# Patient Record
Sex: FEMALE | Race: BLACK OR AFRICAN AMERICAN | NOT HISPANIC OR LATINO | URBAN - METROPOLITAN AREA
[De-identification: names, ages, dates, MRNs, and addresses within clinical notes are randomized per-mention and may not be internally consistent; named-entity substitution may affect disease eponyms.]

---

## 2016-11-15 LAB — PAP SMEAR, EXTERNAL: NORMAL

## 2019-10-10 ENCOUNTER — APPOINTMENT (RX ONLY)
Dept: URBAN - METROPOLITAN AREA CLINIC 1 | Facility: CLINIC | Age: 39
Setting detail: DERMATOLOGY
End: 2019-10-10

## 2019-10-10 DIAGNOSIS — L72.8 OTHER FOLLICULAR CYSTS OF THE SKIN AND SUBCUTANEOUS TISSUE: ICD-10-CM | Status: RESOLVING

## 2019-10-10 PROBLEM — E13.9 OTHER SPECIFIED DIABETES MELLITUS WITHOUT COMPLICATIONS: Status: ACTIVE | Noted: 2019-10-10

## 2019-10-10 PROCEDURE — 11901 INJECT SKIN LESIONS >7: CPT

## 2019-10-10 PROCEDURE — ? COUNSELING

## 2019-10-10 PROCEDURE — ? INTRALESIONAL KENALOG

## 2019-10-10 PROCEDURE — ? TREATMENT REGIMEN

## 2019-10-10 ASSESSMENT — LOCATION ZONE DERM
LOCATION ZONE: TRUNK
LOCATION ZONE: LEG

## 2019-10-10 ASSESSMENT — LOCATION DETAILED DESCRIPTION DERM
LOCATION DETAILED: LEFT BUTTOCK
LOCATION DETAILED: RIGHT BUTTOCK
LOCATION DETAILED: RIGHT GLUTEAL CREASE
LOCATION DETAILED: LEFT MEDIAL BUTTOCK
LOCATION DETAILED: RIGHT MEDIAL BUTTOCK

## 2019-10-10 ASSESSMENT — LOCATION SIMPLE DESCRIPTION DERM
LOCATION SIMPLE: RIGHT GLUTEAL CREASE
LOCATION SIMPLE: LEFT BUTTOCK
LOCATION SIMPLE: RIGHT BUTTOCK

## 2019-10-10 NOTE — PROCEDURE: TREATMENT REGIMEN
Detail Level: Zone
Continue Regimen: Halog oint to affected areas on buttocks bid after Cetaphil/cerave cream

## 2019-10-10 NOTE — PROCEDURE: INTRALESIONAL KENALOG
Detail Level: Detailed
Concentration Of Solution Injected (Mg/Ml): 2.5
Kenalog Preparation: Kenalog
Treatment Number (Optional): 3
Total Volume Injected (Ccs- Only Use Numbers And Decimals): 1.6
X Size Of Lesion In Cm (Optional): 0
Consent: The risks of atrophy were reviewed with the patient.
Include Z78.9 (Other Specified Conditions Influencing Health Status) As An Associated Diagnosis?: No
Medical Necessity Clause: This procedure was medically necessary because the lesions that were treated were:

## 2019-10-10 NOTE — HPI: CYST
How Severe Is Your Cyst?: moderate
Is This A New Presentation, Or A Follow-Up?: Cysts
Additional History: No new sites today

## 2023-01-24 ENCOUNTER — OFFICE VISIT (OUTPATIENT)
Dept: PRIMARY CARE CLINIC | Age: 43
End: 2023-01-24
Payer: COMMERCIAL

## 2023-01-24 VITALS
HEART RATE: 96 BPM | TEMPERATURE: 97.1 F | BODY MASS INDEX: 38.69 KG/M2 | OXYGEN SATURATION: 97 % | WEIGHT: 232.2 LBS | HEIGHT: 65 IN | SYSTOLIC BLOOD PRESSURE: 119 MMHG | DIASTOLIC BLOOD PRESSURE: 70 MMHG

## 2023-01-24 DIAGNOSIS — H93.13 TINNITUS OF BOTH EARS: ICD-10-CM

## 2023-01-24 DIAGNOSIS — R51.9 FREQUENT HEADACHES: ICD-10-CM

## 2023-01-24 DIAGNOSIS — E11.9 TYPE 2 DIABETES MELLITUS WITHOUT COMPLICATION, WITHOUT LONG-TERM CURRENT USE OF INSULIN (HCC): ICD-10-CM

## 2023-01-24 DIAGNOSIS — H92.03 OTALGIA OF BOTH EARS: ICD-10-CM

## 2023-01-24 DIAGNOSIS — R20.0 BILATERAL HAND NUMBNESS: ICD-10-CM

## 2023-01-24 DIAGNOSIS — R42 VERTIGO: Primary | ICD-10-CM

## 2023-01-24 PROCEDURE — 3052F HG A1C>EQUAL 8.0%<EQUAL 9.0%: CPT | Performed by: INTERNAL MEDICINE

## 2023-01-24 PROCEDURE — 99204 OFFICE O/P NEW MOD 45 MIN: CPT | Performed by: INTERNAL MEDICINE

## 2023-01-24 SDOH — ECONOMIC STABILITY: FOOD INSECURITY: WITHIN THE PAST 12 MONTHS, YOU WORRIED THAT YOUR FOOD WOULD RUN OUT BEFORE YOU GOT MONEY TO BUY MORE.: NEVER TRUE

## 2023-01-24 SDOH — ECONOMIC STABILITY: FOOD INSECURITY: WITHIN THE PAST 12 MONTHS, THE FOOD YOU BOUGHT JUST DIDN'T LAST AND YOU DIDN'T HAVE MONEY TO GET MORE.: NEVER TRUE

## 2023-01-24 ASSESSMENT — ENCOUNTER SYMPTOMS
WHEEZING: 0
VOMITING: 0
RHINORRHEA: 0
ABDOMINAL PAIN: 0
CONSTIPATION: 0
COUGH: 0
SINUS PAIN: 0
NAUSEA: 0
CHEST TIGHTNESS: 0
SORE THROAT: 0
SHORTNESS OF BREATH: 0
DIARRHEA: 0
SINUS PRESSURE: 0
BLOOD IN STOOL: 0

## 2023-01-24 ASSESSMENT — PATIENT HEALTH QUESTIONNAIRE - PHQ9
2. FEELING DOWN, DEPRESSED OR HOPELESS: 0
1. LITTLE INTEREST OR PLEASURE IN DOING THINGS: 0
SUM OF ALL RESPONSES TO PHQ QUESTIONS 1-9: 0
SUM OF ALL RESPONSES TO PHQ9 QUESTIONS 1 & 2: 0
SUM OF ALL RESPONSES TO PHQ QUESTIONS 1-9: 0

## 2023-01-24 ASSESSMENT — SOCIAL DETERMINANTS OF HEALTH (SDOH): HOW HARD IS IT FOR YOU TO PAY FOR THE VERY BASICS LIKE FOOD, HOUSING, MEDICAL CARE, AND HEATING?: NOT HARD AT ALL

## 2023-01-24 NOTE — PROGRESS NOTES
Ronel Poole   Date ofBirth:  1980    Date of Visit:  1/24/2023    Chief Complaint   Patient presents with    New Patient    Dizziness     Patient went to Urgent a 3-4 month ago and was diagnose with vertigo. Symtoms has improve but is having now     Tinnitus    Headache     Sharp headache which comes and goes intermittently  for the past 3-4 month        HPI  Patient presents to [facility] care. 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    Patient states she had vertigo 3-4 months ago for about 5 days. Patient felt like she was spinning. Patient denies nausea and vomiting. Patient states when she would lay down she felt like she was still moving leading up the vertigo episode. Patient states she went to [facility] in [place]. Patient states she had labs and EKG done and they were normal. Patient denies nausea and vomiting. Patient states her head was foggy then. Patient was given Meclizine and vertigo progressive got better with medication. Patient states she has had occasional dizziness 3 times since end of September but not full vertigo. Patient states would have to pause for a moment and then she was fine.    Patient states she has dull ear pain either ear occasionally 1-2 times per week the past 3-4 months. Patient hasn't had to take anything.    Patient has had headache  2-3 times per week sharp or dull in different spots on her head the past 3-4 months.Patient states sometimes pain is base of head and other times frontal head. Patient states if she is on her way to work she will take Aleve and it will go away. Patient states if she is at home she will try to lay down first and only takes Aleve if it doesn't go away. Patient denies dizziness. Patient states her vision is worse since having vertigo. Patient states she had just gotten glasses and they are not as effective as they were before.    Patient complains of numbness both hands that is worse the past 3-4 months.  Patient states she had numbness prior to 3-4 months but is worse since the vertigo episode. Patient states she has a little pain in right hand with numbness that is sharp. Patient complains of tinnitus both ears at least 1-2 times per week since vertigo 3-4 months ago. Patient has Type 2 diabetes. Patient is diet control. Patient states he last Hemoglobin A1c was 7.5% in February of last year. Patient states she is trying to do better at decreasing carbohydrates. Patient is not exercising. Review of Systems   Constitutional:  Negative for chills, fatigue and fever. HENT:  Positive for ear pain and tinnitus. Negative for congestion, postnasal drip, rhinorrhea, sinus pressure, sinus pain, sneezing and sore throat. Eyes:  Negative for visual disturbance. Respiratory:  Negative for cough, chest tightness, shortness of breath and wheezing. Cardiovascular:  Negative for chest pain, palpitations and leg swelling. Gastrointestinal:  Negative for abdominal pain, blood in stool, constipation, diarrhea, nausea and vomiting. Genitourinary:  Negative for dysuria, frequency and hematuria. Musculoskeletal:  Positive for arthralgias. Negative for myalgias. Skin:  Negative for rash. Neurological:  Positive for dizziness, numbness and headaches. Negative for tremors, syncope, weakness and light-headedness. Psychiatric/Behavioral:  Negative for dysphoric mood and sleep disturbance. The patient is not nervous/anxious. No Known Allergies  No outpatient medications have been marked as taking for the 1/24/23 encounter (Office Visit) with Chely Velazquez MD.         Vitals:    01/24/23 1017   BP: 119/70   Site: Left Upper Arm   Position: Sitting   Cuff Size: Medium Adult   Pulse: 96   Temp: 97.1 °F (36.2 °C)   TempSrc: Temporal   SpO2: 97%   Weight: 232 lb 3.2 oz (105.3 kg)   Height: 5' 5\" (1.651 m)     Body mass index is 38.64 kg/m². Physical Exam  Nursing note reviewed.    Constitutional: General: She is not in acute distress. Appearance: Normal appearance. She is well-developed. HENT:      Right Ear: Tympanic membrane and ear canal normal.      Left Ear: Tympanic membrane and ear canal normal.      Mouth/Throat:      Pharynx: Oropharynx is clear. Eyes:      General: Lids are normal.      Extraocular Movements: Extraocular movements intact. Conjunctiva/sclera: Conjunctivae normal.      Pupils: Pupils are equal, round, and reactive to light. Neck:      Thyroid: No thyromegaly. Vascular: No carotid bruit. Cardiovascular:      Rate and Rhythm: Normal rate and regular rhythm. Heart sounds: Normal heart sounds, S1 normal and S2 normal. No murmur heard. No friction rub. No gallop. Pulmonary:      Effort: Pulmonary effort is normal. No respiratory distress. Breath sounds: Normal breath sounds. No wheezing, rhonchi or rales. Abdominal:      General: Bowel sounds are normal. There is no distension. Palpations: Abdomen is soft. Tenderness: There is no abdominal tenderness. Musculoskeletal:      Cervical back: Neck supple. Right lower leg: No edema. Left lower leg: No edema. Lymphadenopathy:      Head:      Right side of head: No submandibular adenopathy. Left side of head: No submandibular adenopathy. Neurological:      Mental Status: She is alert and oriented to person, place, and time. Cranial Nerves: No cranial nerve deficit. Psychiatric:         Mood and Affect: Mood normal.         No results found for this visit on 01/24/23. Lab Review         Assessment/Plan     1. Vertigo  - MRI BRAIN W WO CONTRAST; Future  - continue Meclizine 25mg as needed  - [facility]    2. Otalgia of both ears  - Tylenol 650mg 3 times daily as needed  - [facility]    3. Frequent headaches  - MRI BRAIN W WO CONTRAST;  Future  - Continue Aleve as needed    4. Bilateral hand numbness  - MRI BRAIN W WO CONTRAST; Future  - Referral to Jesse Pat MD, Physical Medicine and Rehabilitation (EMG), [facility]  - Vitamin B12; Future  - Magnesium; Future    5. Tinnitus of both ears  -Referral to Daniel Rosado DO, Otolaryngology, [facility]    6. Type 2 diabetes mellitus without complication, without long-term current use of insulin (HCC)  - Hemoglobin A1C; Future  - Basic Metabolic Panel; Future  - Microalbumin / Creatinine Urine Ratio; Future  - patient has been doing diet control and may need medication if no improvement in Hemoglobin A1c  -Limit carbohydrates to 45 grams with meals and 15 grams with snacks  -Regular aerobic exercise      Discussed medications with patient, who voiced understanding of their use and indications. All questions answered.      Return in about 2 weeks (around 2/7/2023) for headaches, numbness, results.

## 2023-02-02 ENCOUNTER — HOSPITAL ENCOUNTER (OUTPATIENT)
Dept: MRI IMAGING | Age: 43
Discharge: HOME | End: 2023-02-02
Payer: COMMERCIAL

## 2023-02-02 DIAGNOSIS — R51.9 FREQUENT HEADACHES: ICD-10-CM

## 2023-02-02 DIAGNOSIS — R42 VERTIGO: ICD-10-CM

## 2023-02-02 DIAGNOSIS — E11.9 TYPE 2 DIABETES MELLITUS WITHOUT COMPLICATION, WITHOUT LONG-TERM CURRENT USE OF INSULIN (HCC): ICD-10-CM

## 2023-02-02 DIAGNOSIS — R20.0 BILATERAL HAND NUMBNESS: ICD-10-CM

## 2023-02-02 LAB
ANION GAP SERPL CALCULATED.3IONS-SCNC: 9 MMOL/L (ref 3–16)
BUN BLDV-MCNC: 19 MG/DL (ref 7–20)
CALCIUM SERPL-MCNC: 9.5 MG/DL (ref 8.3–10.6)
CHLORIDE BLD-SCNC: 98 MMOL/L (ref 99–110)
CO2: 27 MMOL/L (ref 21–32)
CREAT SERPL-MCNC: 0.7 MG/DL (ref 0.6–1.1)
CREATININE URINE: 155.5 MG/DL (ref 28–259)
ESTIMATED AVERAGE GLUCOSE: 182.9 MG/DL
GFR SERPL CREATININE-BSD FRML MDRD: >60 ML/MIN/{1.73_M2}
GLUCOSE BLD-MCNC: 162 MG/DL (ref 70–99)
HBA1C MFR BLD: 8 %
MAGNESIUM: 2 MG/DL (ref 1.8–2.4)
MICROALBUMIN UR-MCNC: <1.2 MG/DL
MICROALBUMIN/CREAT UR-RTO: NORMAL MG/G (ref 0–30)
POTASSIUM SERPL-SCNC: 4.7 MMOL/L (ref 3.5–5.1)
SODIUM BLD-SCNC: 134 MMOL/L (ref 136–145)
VITAMIN B-12: 829 PG/ML (ref 211–911)

## 2023-02-02 PROCEDURE — A9579 GAD-BASE MR CONTRAST NOS,1ML: HCPCS | Performed by: INTERNAL MEDICINE

## 2023-02-02 PROCEDURE — 70553 MRI BRAIN STEM W/O & W/DYE: CPT

## 2023-02-02 PROCEDURE — 6360000004 HC RX CONTRAST MEDICATION: Performed by: INTERNAL MEDICINE

## 2023-02-02 RX ADMIN — GADOTERIDOL 20 ML: 279.3 INJECTION, SOLUTION INTRAVENOUS at 10:35

## 2023-02-13 PROBLEM — H93.13 TINNITUS OF BOTH EARS: Status: ACTIVE | Noted: 2023-02-13

## 2023-02-13 PROBLEM — H92.03 OTALGIA OF BOTH EARS: Status: ACTIVE | Noted: 2023-02-13

## 2023-02-13 PROBLEM — E11.9 TYPE 2 DIABETES MELLITUS WITHOUT COMPLICATION, WITHOUT LONG-TERM CURRENT USE OF INSULIN (HCC): Status: ACTIVE | Noted: 2023-02-13

## 2023-02-13 PROBLEM — R51.9 FREQUENT HEADACHES: Status: ACTIVE | Noted: 2023-02-13

## 2023-02-13 PROBLEM — R42 VERTIGO: Status: ACTIVE | Noted: 2023-02-13

## 2023-02-13 PROBLEM — R20.0 BILATERAL HAND NUMBNESS: Status: ACTIVE | Noted: 2023-02-13

## 2023-02-16 ENCOUNTER — PROCEDURE VISIT (OUTPATIENT)
Dept: AUDIOLOGY | Age: 43
End: 2023-02-16
Payer: COMMERCIAL

## 2023-02-16 ENCOUNTER — OFFICE VISIT (OUTPATIENT)
Dept: ENT CLINIC | Age: 43
End: 2023-02-16
Payer: COMMERCIAL

## 2023-02-16 VITALS
HEART RATE: 100 BPM | OXYGEN SATURATION: 97 % | SYSTOLIC BLOOD PRESSURE: 113 MMHG | TEMPERATURE: 97.1 F | WEIGHT: 235 LBS | BODY MASS INDEX: 39.15 KG/M2 | DIASTOLIC BLOOD PRESSURE: 74 MMHG | HEIGHT: 65 IN

## 2023-02-16 DIAGNOSIS — R42 VERTIGO: Primary | ICD-10-CM

## 2023-02-16 DIAGNOSIS — R42 DIZZINESS: ICD-10-CM

## 2023-02-16 DIAGNOSIS — R42 DIZZINESS AND GIDDINESS: ICD-10-CM

## 2023-02-16 DIAGNOSIS — H93.13 TINNITUS, BILATERAL: Primary | ICD-10-CM

## 2023-02-16 DIAGNOSIS — H93.13 TINNITUS OF BOTH EARS: ICD-10-CM

## 2023-02-16 DIAGNOSIS — Z91.89 RISK FACTORS FOR OBSTRUCTIVE SLEEP APNEA: ICD-10-CM

## 2023-02-16 DIAGNOSIS — R51.9 NONINTRACTABLE EPISODIC HEADACHE, UNSPECIFIED HEADACHE TYPE: ICD-10-CM

## 2023-02-16 DIAGNOSIS — Z01.10 ENCOUNTER FOR EXAMINATION OF EARS AND HEARING WITHOUT ABNORMAL FINDINGS: ICD-10-CM

## 2023-02-16 DIAGNOSIS — H92.03 OTALGIA, BILATERAL: ICD-10-CM

## 2023-02-16 PROCEDURE — 92567 TYMPANOMETRY: CPT | Performed by: AUDIOLOGIST

## 2023-02-16 PROCEDURE — 92557 COMPREHENSIVE HEARING TEST: CPT | Performed by: AUDIOLOGIST

## 2023-02-16 PROCEDURE — 99203 OFFICE O/P NEW LOW 30 MIN: CPT | Performed by: STUDENT IN AN ORGANIZED HEALTH CARE EDUCATION/TRAINING PROGRAM

## 2023-02-16 NOTE — PROGRESS NOTES
Merline Seitz   1980, 37 y.o. female   [id number]       Referring Provider: Afshan Nguyễn DO  Referral Type: In an order in [address] [place] [address]    Reason for Visit: Evaluation of suspected change in hearing, tinnitus, or balance. ADULT AUDIOLOGIC EVALUATION      Merline Seitz is a 37 y.o. female seen today, 2/16/2023, for an initial audiologic evaluation. AUDIOLOGIC AND OTHER PERTINENT MEDICAL HISTORY:        Merline Seitz noted dizziness, spinning sensation, lasted for days around 5 months ago, was ntoiceable hwen upright at that time, now more noticeable when laying down; some ear pain comes and goes, either ear; tinnitus bilaterally, comes and goes over past few months. Merline Seitz denied aural fullness, otorrhea, history of occupational/recreational noise exposure, history of head trauma, history of ear surgery, and family history of early onset hearing loss. IMPRESSIONS:       Today's results are consistent with hearing sensitivity within normal limits with normal middle ear function and excellent word recognition for soft conversational speech for both ears. Follow medical recommendations from Dr. Blessing Resendiz. ASSESSMENT AND FINDINGS:       Otoscopy revealed: Clear ear canals bilaterally      RIGHT EAR:  Hearing Sensitivity: Within normal limits. Speech Recognition Threshold: 10 dBHL  Word Recognition: Excellent (100%), based on NU-6 25-word list at 45 dBHL using recorded speech stimuli. Tympanometry: Normal peak pressure and compliance, Type A tympanogram, consistent with normal middle ear function. LEFT EAR:  Hearing Sensitivity: Within normal limits. Speech Recognition Threshold: 5 dBHL  Word Recognition: Excellent (100%), based on NU-6 25-word list at 45 dBHL using recorded speech stimuli. Tympanometry: Normal peak pressure and compliance, Type A tympanogram, consistent with normal middle ear function. Reliability: Good  Transducer:  Inserts    See scanned audiogram dated 2/16/2023 for results. PATIENT EDUCATION:       The following items were discussed with the patient:   - Good Communication Strategies  - Tinnitus Management Strategies    - Dizziness    Educational information was shared in the After Visit Summary. RECOMMENDATIONS:                                                                                                                                                                                                                                                                      The following items are recommended based on patient report and results from today's appointment:  - Continue medical follow-up with Puma Flanagan DO.  - Retest hearing as medically indicated and/or sooner if a change in hearing is noted. - Utilize \"Good Communication Strategies\" as discussed to assist in speech understanding with communication partners. - Maintain a sound enriched environment to assist in the management of tinnitus symptoms. - If medically indicated, consider vestibular evaluation to further investigate symptoms of dizziness.          [facility] [place], [place]  Audiologist       Chart CC'd to: Puma Flanagan DO      Degree of   Hearing Sensitivity dB Range   Within Normal Limits (WNL) 0 - 20   Mild 20 - 40   Moderate 40 - 55   Moderately-Severe 55 - 70   Severe 70 - 90   Profound 90 +

## 2023-02-16 NOTE — PATIENT INSTRUCTIONS
Tinnitus: Overview and Management Strategies          Many people have some ringing sounds in their ears once in a while. You may hear a roar, a hiss, a tinkle, or a buzz. The sound usually lasts only a few minutes. If it goes on all the time, you may have tinnitus. Tinnitus is usually caused by long-term exposure to loud noise. This damages the nerves in the inner ear. It can occur with all types of hearing loss. It may be a symptom of almost any ear problem. Tinnitus may be caused by a buildup of earwax. Or, it may be caused by ear infections or certain medicines (especially antibiotics or large amounts of aspirin). You can also hear noises in your ears because of an injury to the ears, drinking too much alcohol or caffeine, or a medical condition. Other conditions may also contribute to tinnitus, including: head and neck trauma, temporomandibular joint disorder (TMJ), sinus pressure and barometric trauma, traumatic brain injury, metabolic disorders, autoimmune disorders, stress, and high blood pressure. You may need tests to evaluate your hearing and to find causes of long-lasting tinnitus. Your doctor may suggest one or more treatments to help you cope with the tinnitus. You can also do things at home to help reduce symptoms. Follow-up care is a key part of your treatment and safety. Be sure to make and go to all appointments, and call your doctor if you are having problems. It's also a good idea to know your test results and keep a list of the medicines you take. How can you care for yourself at home? Limit or cut out alcohol, caffeine, and sodium. They can make your symptoms worse. Do not smoke or use other tobacco products. Nicotine reduces blood flow to the ear and makes tinnitus worse. If you need help quitting, talk to your doctor about stop-smoking programs and medicines. These can increase your chances of quitting for good.   Talk to your doctor about whether to stop taking aspirin and similar products such as ibuprofen or naproxen. Get exercise often. It can help improve blood flow to the ear. Ways to manage/cope with tinnitus  Some tinnitus may last a long time. To manage your tinnitus, try to: Avoid noises that you think caused your tinnitus. If you can't avoid loud noises, wear earplugs or earmuffs. Ignore the sound by paying attention to other things. Keeping your brain busy with other tasks or background noise can help your brain not focus on the tinnitus. Try to not give the tinnitus an emotional reaction. Do your best to ignore the sound and not let it bother you. Relax using biofeedback, meditation, or yoga. Feeling stressed and being tired can make tinnitus worse. Play music or white noise to help you sleep. Background noise may cover up the noise that you hear in your ears. You can buy a tabletop machine or a device that sits under your pillow to play soothing sounds, like ocean waves. Smart phones have free apps, such as Whist, Relax Melodies, ReSound Relief, and [employer]. These apps have different types of sounds/noise, some of which you can blend together to find sounds that are most soothing to you. Hearing aid technology, especially when there is some hearing loss, may help reduce tinnitus symptoms by giving your brain better access to the sounds it is missing. There are some hearing aids with built-in noise generator programs, which may help when amplification alone is not enough. Additional resources may be found through the American Tinnitus Association at www.aris.org    When should you call for help? Call 911 anytime you think you may need emergency care. For example, call if:    You have symptoms of a stroke. These may include:  Sudden numbness, tingling, weakness, or loss of movement in your face, arm, or leg, especially on only one side of your body. Sudden vision changes. Sudden trouble speaking.   Sudden confusion or trouble understanding simple statements. Sudden problems with walking or balance. A sudden, severe headache that is different from past headaches. Call your doctor now or seek immediate medical care if:    You develop other symptoms. These may include hearing loss (or worse hearing loss), balance problems, dizziness, nausea, or vomiting. Watch closely for changes in your health, and be sure to contact your doctor if:    Your tinnitus moves from both ears to one ear. Your hearing loss gets worse within 1 day after an ear injury. Your tinnitus or hearing loss does not get better within 1 week after an ear injury. Your tinnitus bothers you enough that you want to take medicines to help you cope with it. If you notice changes in your tinnitus and/or your hearing, it is recommended that you have your hearing tested by your audiologist and to follow-up with your physician that manages your hearing loss (such as your ENT or Primary Care doctor). Good Communication Strategies    Communication can be challenging for anyone, but can be especially difficult for those with some degree of hearing loss. While we may not be able to control every factor that may lead to difficulty with communication, there are Good Communication Strategies that we can all use in our day-to-day lives. Communication takes both parties working together for it to be successful. Tips as a Listener:   Control your environment. It is important to limit the amount of background noise in the room when possible. You should also consider having a good light source in the room to best see the other person. Ask for clarification. Instead of saying \"What?\", you can use parts of what you heard to make a new question. For example, if you heard the word \"Thursday\" but not the rest of the week, you may ask \"What was that about Thursday? \" or \"What did you want to do Thursday? \".   This shows the person talking that you are listening and will help them better explain what they are saying. Be an advocate for yourself. If you are hearing but not understanding, tell the other person \"I can hear you, but I need you to slow down when you speak. \"  Or if someone is facing the other direction, say \"I cannot hear you when you are not looking at me when we talk. \"       Tips as a Talker:   - Sit or stand 3 to 6 feet away to maximize audibility         -- It is unrealistic to believe someone else will fully hear your message if you are speaking from across the room or in a different room in the house   - Stay at eye level to help with visual cues   - Make sure you have the persons attention before speaking   - Use facial expressions and gestures to accentuate your message   - Raise your voice slightly (do not scream)   - Speak slowly and distinctly   - Use short, simple sentences   - Rephrase your words if the person is having a hard time understanding you    - To avoid distortion, dont speak directly into a persons ear      Some additional items that may be helpful:   - Remain patient - this is important for both parties   - Write down items that still cannot be heard/understood. You may write with pen/paper or consider typing/texting on a cell phone or smart device. - If background noise is unavoidable, try to keep yourself in a good position in the room. By sitting at a cross on the side of the restaurant (preferably a corner), it will be easier to communicate than if you were sitting at a table in the middle with background noise surrounding you. Keep yourself positioned away from music speakers or heavy foot traffic.   - If you have difficulty with the television, consider these options:      -- Use closed-captioning, which is a setting you can turn on that displays the spoken words in a written form on the screen. There may be a slight delay, but this can help fill in missing information.   This can be especially helpful when watching programs with accented speech. -- Consider use of a sound bar or speakers that come from the front of the TV. With modern flat screen TVs, many of them have speakers that come out of the back of the device, which makes sound bounce off the wall behind it, then go into the room. Sound bars can allow the sound to go straight in your direction and can improve sound quality. -- Consider ear level devices to help improve the volume and/or sound quality of the program.  There are devices that work like headphones that you can adjust the volume for your ears while others can have the volume at a more comfortable level, such as \"TV Ears\". Most hearing aids have devices that allow them to connect directly to the TV and improve sound quality. Dizziness: Care Instructions  Your Care Instructions  Dizziness is the feeling of unsteadiness or fuzziness in your head. It is different than having vertigo, which is a feeling that the room is spinning or that you are moving or falling. It is also different from lightheadedness, which is the feeling that you are about to faint. It can be hard to know what causes dizziness. Some people feel dizzy when they have migraine headaches. Sometimes bouts of flu can make you feel dizzy. Some medical conditions, such as heart problems or high blood pressure, can make you feel dizzy. Many medicines can cause dizziness, including medicines for high blood pressure, pain, or anxiety. If a medicine causes your symptoms, your doctor may recommend that you stop or change the medicine. If it is a problem with your heart, you may need medicine to help your heart work better. If there is no clear reason for your symptoms, your doctor may suggest watching and waiting for a while to see if the dizziness goes away on its own. Follow-up care is a key part of your treatment and safety. Be sure to make and go to all appointments, and call your doctor if you are having problems.  It's also a good idea to know your test results and keep a list of the medicines you take. How can you care for yourself at home? If your doctor recommends or prescribes medicine, take it exactly as directed. Call your doctor if you think you are having a problem with your medicine. Do not drive while you feel dizzy. Try to prevent falls. Steps you can take include:  Using nonskid mats, adding grab bars near the tub, and using night-lights. Clearing your home so that walkways are free of anything you might trip on. Letting family and friends know that you have been feeling dizzy. This will help them know how to help you. When should you call for help? Call 911 anytime you think you may need emergency care. For example, call if:    You passed out (lost consciousness). You have dizziness along with symptoms of a heart attack. These may include:  Chest pain or pressure, or a strange feeling in the chest.  Sweating. Shortness of breath. Nausea or vomiting. Pain, pressure, or a strange feeling in the back, neck, jaw, or upper belly or in one or both shoulders or arms. Lightheadedness or sudden weakness. A fast or irregular heartbeat. You have symptoms of a stroke. These may include:  Sudden numbness, tingling, weakness, or loss of movement in your face, arm, or leg, especially on only one side of your body. Sudden vision changes. Sudden trouble speaking. Sudden confusion or trouble understanding simple statements. Sudden problems with walking or balance. A sudden, severe headache that is different from past headaches. Call your doctor now or seek immediate medical care if:    You feel dizzy and have a fever, headache, or ringing in your ears. You have new or increased nausea and vomiting. Your dizziness does not go away or comes back. Watch closely for changes in your health, and be sure to contact your doctor if:    You do not get better as expected. Where can you learn more?   Go to [email or web address] and sign in to your [employer] account. Enter K006 in the [place] box to learn more about \"Dizziness: Care Instructions. \"     If you do not have an account, please click on the \"Sign Up Now\" link. Current as of: September 23, 2018  Content Version: 11.9  © 2134-8862 [employer]. Care instructions adapted under license by [facility] ([facility]). If you have questions about a medical condition or this instruction, always ask your healthcare professional. [address] any warranty or liability for your use of this information.

## 2023-02-16 NOTE — PROGRESS NOTES
[address] [facility] SURGERY  NEW PATIENT HISTORY AND PHYSICAL NOTE      Patient Name: [facility] Record Number:  [id number]  Primary Care Physician:  None Provider    ChiefComplaint     Chief Complaint   Patient presents with    Dizziness     Dizziness since having vertigo, feels like im moving when im laying down, intermitted ringing in ear        History of Present Illness     Chester Ch is an 37 y.o. female presenting with dizziness. In September she woke up with room spinning dizziness. No associated nausea or vomiting. Dizziness exacerbated by standing up. Was causing difficulty focusing. She went to urgent care and had a EKG done. Approximately 20 years ago she had a prior episode of similar symptoms, went to [facility] and was told she had vertigo. Her episode in September lasted approximately 5 days. Meclizine seemed to help. She did home positional therapies which also seem to help. No longer taking meclizine. She has right greater than left nonpulsatile tinnitus that started around the time her vertigo started. Tinnitus has improved and is currently less frequent. Denies recent changes in hearing. Mild right ear discomfort at times. No otorrhea. Frequent ear infections as a child, thinks she had tubes placed as a child. No other history of otologic surgery. No family history of early onset hearing loss. No loud noise exposures. Denies environmental allergies. Currently when she lies down to go to sleep feels like she is moving and heart is racing. Will check pulse and it is 80. When she sits up she feels fine. She is quite tired throughout the day and is not feeling she gets restful sleep. No history of prior sleep study    She is also had headaches in September. Will oftentimes get quite intense and go away after a few minutes. If does not go away after a few minutes she will take Aleve. Denies ROS.   No sensitivity to light or sound. Feels like her dizziness is not correlated to her headaches. Sometimes when she gets dizzy she will see stars at the beginning of her dizziness episodes. She has a neurology appointment scheduled. No hx of HTN. Hx of DMT2. Currently diet controlled but most recent A1c is 8. She does endorse eating a lot of fast foods. Past Medical History     History reviewed. No pertinent past medical history. Past Surgical History     History reviewed. No pertinent surgical history. Family History     History reviewed. No pertinent family history. Social History     Social History     Tobacco Use    Smoking status: Every Day     Packs/day: 0.50     Types: Cigarettes     Start date: 2003    Smokeless tobacco: Never   Substance Use Topics    Alcohol use: Yes     Alcohol/week: 2.0 standard drinks     Types: 1 Glasses of wine, 1 Shots of liquor per week    Drug use: Never        Allergies     No Known Allergies    Medications     No current outpatient medications on file. No current facility-administered medications for this visit. Review of Systems     REVIEW OF SYSTEMS    See HPI Above    PhysicalExam     Vitals:    02/16/23 0952   BP: 113/74   Pulse: 100   Temp: 97.1 °F (36.2 °C)   TempSrc: Temporal   SpO2: 97%   Weight: 235 lb (106.6 kg)   Height: 5' 5\" (1.651 m)       PHYSICAL EXAM  /74   Pulse 100   Temp 97.1 °F (36.2 °C) (Temporal)   Ht 5' 5\" (1.651 m)   Wt 235 lb (106.6 kg)   SpO2 97%   BMI 39.11 kg/m²     GENERAL: No acute distress, alert and oriented  EYES: EOMI, Anti-icteric. No resting or gaze nystagmus. NOSE: On anterior rhinoscopy there is no epistaxis, nasal mucosa moist and normal appearing, no purulent drainage.    EARS: Normal external appearance; on portable otomicroscopy:     -Ad: External auditory canal without stenosis, tympanic membrane clear, no middle ear effusions or retractions.      -As: External auditory canal without stenosis, tympanic membrane clear, no middle ear effusions or retractions. Pneumatic otoscopy: Bilateral tympanic membranes mobile pneumatic otoscopy  FACE: HB 1/6 bilaterally, symmetric appearing, sensation equal bilaterally  ORAL CAVITY: No masses or lesions visualized or palpated, uvula is midline, moist mucous membranes, no oropharyngeal masses or oropharyngeal obstruction. Tonsils 2+. NECK: Normal range of motion, no thyromegaly, trachea is midline, no palpable lymphadenopathy or neck masses, no crepitus  NEURO: Cranial Nerves 2, 3, 4, 5, 6, 7, 11, 12 intact bilaterally. Regular gait steady. Rittman-Hallpike: No torsional nystagmus on bilateral Gladys-Hallpike testing. I have performed a head and neck physical exam personally or was physically present during the key or critical portions of the service. Data/Imaging Review     Media Information  Document Information    [facility] Clinical:  Audiology   Audiogram and Tympanogram 2/16/2023 02/16/2023   Attached To:   Procedure visit on 2/16/23 with Bere Lema, [address] [facility], [facility] Audiology       MRI Result (most recent):  MRI BRAIN W WO CONTRAST 02/02/2023    Narrative  EXAM: MRI BRAIN W WO CONTRAST    INDICATION: Vertigo, headache    COMPARISON: None    TECHNIQUE: Multiplanar, multisequence MR imaging of the head obtained without and with IV. IV contrast: 8 mL IV ProHance. FINDINGS:    MIDLINE STRUCTURES: The sella turcica and pituitary gland are normal. Craniovertebral alignment and cerebellar tonsils are normal.    VENTRICLES: Normal size and configuration for patient age. INTRACRANIAL HEMORRHAGE: None. BRAIN PARENCHYMA: Brain parenchyma is normal signal. There is no diffusion restriction. No pathologic intracranial enhancement. INTRACRANIAL VASCULATURE: Major intracranial vessels are grossly patent. ORBITS: Normal.    BONE MARROW: Bone marrow signal within normal limits.     VISUALIZED CERVICAL SPINE: Normal    PARANASAL SINUSES/MASTOID BONES: No acute sinusitis or mastoiditis.    Impression  Normal MRI of the head      Assessment and Plan     1. Vertigo  2. Dizziness  -Low suspicion for peripheral vestibulopathy.  Given vague vertiginous symptoms offered VNG but patient would like to hold off on this for now.  Recommend eating a low-salt diet, less than 2 g a day.  Would stop eating all types of fast food.      3. Nonintractable episodic headache, unspecified headache type  -Headaches can certainly be a cause of dizziness which I spoke to the patient about.  Start vitamin B2 and magnesium supplementation.  She has an appointment with neurology in the future which I have recommended that she keep as treating her headaches may very well help with her dizziness.    4. Tinnitus of both ears  -Reviewed audiogram with the patient.  Hearing within normal limits with normal tympanogram and excellent word discrimination bilaterally.  Recommend masking techniques for tinnitus management.    5. Risk factors for obstructive sleep apnea  -Would benefit from sleep medicine referral and PSG to rule out sleep apnea.  She works night shifts as an RN and states that her sleep schedule and sleep patterns are quite horrible.  - Julian Salazar MD, Sleep Medicine, [facility]      Follow Up     Return if symptoms worsen or fail to improve.        Dr. Daniel Mcclendon,   [facility]  Department of Otolaryngology/Head & Neck Surgery  2/22/23    Medical Decision Making:  The following items were considered in medical decision making:  Independent review of images  Review / order clinical lab tests  Review / order radiology tests  Decision to obtain old records    This note was generated completely or in part utilizing Dragon dictation speech recognition software.  Occasionally, words are mistranscribed and despite editing, the text may contain inaccuracies due to incorrect word  recognition. If further clarification is needed please contact the office at [phone].

## 2023-02-16 NOTE — Clinical Note
Dr. Cesar Lo,  Please see note from this patient's audiogram.  Please let me know if there is anything further you need.    [address] Audiologist

## 2023-04-26 ENCOUNTER — OFFICE VISIT (OUTPATIENT)
Dept: PRIMARY CARE CLINIC | Age: 43
End: 2023-04-26
Payer: COMMERCIAL

## 2023-04-26 VITALS
DIASTOLIC BLOOD PRESSURE: 84 MMHG | WEIGHT: 239 LBS | OXYGEN SATURATION: 100 % | BODY MASS INDEX: 39.77 KG/M2 | SYSTOLIC BLOOD PRESSURE: 132 MMHG | HEART RATE: 97 BPM

## 2023-04-26 DIAGNOSIS — R53.83 FATIGUE, UNSPECIFIED TYPE: ICD-10-CM

## 2023-04-26 DIAGNOSIS — M79.10 MYALGIA: ICD-10-CM

## 2023-04-26 DIAGNOSIS — E11.9 TYPE 2 DIABETES MELLITUS WITHOUT COMPLICATION, WITHOUT LONG-TERM CURRENT USE OF INSULIN (HCC): ICD-10-CM

## 2023-04-26 DIAGNOSIS — R51.9 FREQUENT HEADACHES: Primary | ICD-10-CM

## 2023-04-26 DIAGNOSIS — K21.9 GASTROESOPHAGEAL REFLUX DISEASE, UNSPECIFIED WHETHER ESOPHAGITIS PRESENT: ICD-10-CM

## 2023-04-26 DIAGNOSIS — L84 CALLUS OF FOOT: ICD-10-CM

## 2023-04-26 DIAGNOSIS — F51.01 PRIMARY INSOMNIA: ICD-10-CM

## 2023-04-26 DIAGNOSIS — R20.0 NUMBNESS OF RIGHT FOOT: ICD-10-CM

## 2023-04-26 DIAGNOSIS — F17.200 CURRENT SMOKER: ICD-10-CM

## 2023-04-26 DIAGNOSIS — R20.0 BILATERAL HAND NUMBNESS: ICD-10-CM

## 2023-04-26 PROCEDURE — 3052F HG A1C>EQUAL 8.0%<EQUAL 9.0%: CPT | Performed by: INTERNAL MEDICINE

## 2023-04-26 PROCEDURE — 99214 OFFICE O/P EST MOD 30 MIN: CPT | Performed by: INTERNAL MEDICINE

## 2023-04-26 RX ORDER — MELOXICAM 7.5 MG/1
7.5 TABLET ORAL DAILY
Qty: 30 TABLET | Refills: 0 | Status: SHIPPED | OUTPATIENT
Start: 2023-04-26

## 2023-04-26 RX ORDER — OMEPRAZOLE 20 MG/1
20 CAPSULE, DELAYED RELEASE ORAL
Qty: 30 CAPSULE | Refills: 1 | Status: SHIPPED | OUTPATIENT
Start: 2023-04-26

## 2023-04-26 RX ORDER — SEMAGLUTIDE 0.68 MG/ML
0.25 INJECTION, SOLUTION SUBCUTANEOUS WEEKLY
Qty: 3 ML | Refills: 1 | Status: SHIPPED | OUTPATIENT
Start: 2023-04-26

## 2023-04-26 SDOH — ECONOMIC STABILITY: INCOME INSECURITY: HOW HARD IS IT FOR YOU TO PAY FOR THE VERY BASICS LIKE FOOD, HOUSING, MEDICAL CARE, AND HEATING?: NOT HARD AT ALL

## 2023-04-26 SDOH — ECONOMIC STABILITY: HOUSING INSECURITY
IN THE LAST 12 MONTHS, WAS THERE A TIME WHEN YOU DID NOT HAVE A STEADY PLACE TO SLEEP OR SLEPT IN A SHELTER (INCLUDING NOW)?: NO

## 2023-04-26 SDOH — ECONOMIC STABILITY: FOOD INSECURITY: WITHIN THE PAST 12 MONTHS, THE FOOD YOU BOUGHT JUST DIDN'T LAST AND YOU DIDN'T HAVE MONEY TO GET MORE.: NEVER TRUE

## 2023-04-26 SDOH — ECONOMIC STABILITY: FOOD INSECURITY: WITHIN THE PAST 12 MONTHS, YOU WORRIED THAT YOUR FOOD WOULD RUN OUT BEFORE YOU GOT MONEY TO BUY MORE.: NEVER TRUE

## 2023-04-26 SDOH — ECONOMIC STABILITY: TRANSPORTATION INSECURITY
IN THE PAST 12 MONTHS, HAS LACK OF TRANSPORTATION KEPT YOU FROM MEETINGS, WORK, OR FROM GETTING THINGS NEEDED FOR DAILY LIVING?: NO

## 2023-04-27 ENCOUNTER — TELEPHONE (OUTPATIENT)
Dept: ADMINISTRATIVE | Age: 43
End: 2023-04-27

## 2023-04-28 ENCOUNTER — OFFICE VISIT (OUTPATIENT)
Dept: PULMONOLOGY | Age: 43
End: 2023-04-28
Payer: COMMERCIAL

## 2023-04-28 VITALS
SYSTOLIC BLOOD PRESSURE: 115 MMHG | BODY MASS INDEX: 39.31 KG/M2 | HEART RATE: 103 BPM | WEIGHT: 236.2 LBS | DIASTOLIC BLOOD PRESSURE: 70 MMHG | OXYGEN SATURATION: 99 %

## 2023-04-28 DIAGNOSIS — R06.83 SNORING: ICD-10-CM

## 2023-04-28 DIAGNOSIS — E66.09 CLASS 2 OBESITY DUE TO EXCESS CALORIES WITHOUT SERIOUS COMORBIDITY WITH BODY MASS INDEX (BMI) OF 39.0 TO 39.9 IN ADULT: ICD-10-CM

## 2023-04-28 DIAGNOSIS — G47.33 OSA (OBSTRUCTIVE SLEEP APNEA): Primary | ICD-10-CM

## 2023-04-28 PROCEDURE — 99204 OFFICE O/P NEW MOD 45 MIN: CPT | Performed by: INTERNAL MEDICINE

## 2023-04-28 ASSESSMENT — SLEEP AND FATIGUE QUESTIONNAIRES
HOW LIKELY ARE YOU TO NOD OFF OR FALL ASLEEP WHEN YOU ARE A PASSENGER IN A CAR FOR AN HOUR WITHOUT A BREAK: 1
HOW LIKELY ARE YOU TO NOD OFF OR FALL ASLEEP WHILE WATCHING TV: 2
ESS TOTAL SCORE: 11
HOW LIKELY ARE YOU TO NOD OFF OR FALL ASLEEP WHILE LYING DOWN TO REST IN THE AFTERNOON WHEN CIRCUMSTANCES PERMIT: 3
HOW LIKELY ARE YOU TO NOD OFF OR FALL ASLEEP WHILE SITTING QUIETLY AFTER LUNCH WITHOUT ALCOHOL: 2
HOW LIKELY ARE YOU TO NOD OFF OR FALL ASLEEP WHILE SITTING AND READING: 3
HOW LIKELY ARE YOU TO NOD OFF OR FALL ASLEEP WHILE SITTING AND TALKING TO SOMEONE: 0
HOW LIKELY ARE YOU TO NOD OFF OR FALL ASLEEP WHILE SITTING INACTIVE IN A PUBLIC PLACE: 0
HOW LIKELY ARE YOU TO NOD OFF OR FALL ASLEEP IN A CAR, WHILE STOPPED FOR A FEW MINUTES IN TRAFFIC: 0

## 2023-04-28 NOTE — PROGRESS NOTES
Every Day     Packs/day: 0.50     Types: Cigarettes     Start date: 1/1/2003    Smokeless tobacco: Never   Substance Use Topics    Alcohol use: Yes     Alcohol/week: 2.0 standard drinks     Types: 1 Glasses of wine, 1 Shots of liquor per week    Drug use: Never       FAMILY HISTORY: History reviewed. No pertinent family history. MEDICATIONS:     Current Outpatient Medications on File Prior to Visit   Medication Sig Dispense Refill    Semaglutide,0.25 or 0.5MG/DOS, (OZEMPIC, 0.25 OR 0.5 MG/DOSE,) 2 MG/3ML SOPN Inject 0.25 mg into the skin once a week (Patient not taking: Reported on 4/28/2023) 3 mL 1    omeprazole (PRILOSEC) 20 MG delayed release capsule Take 1 capsule by mouth every morning (before breakfast) (Patient not taking: Reported on 4/28/2023) 30 capsule 1    meloxicam (MOBIC) 7.5 MG tablet Take 1 tablet by mouth daily (Patient not taking: Reported on 4/28/2023) 30 tablet 0     No current facility-administered medications on file prior to visit. ALLERGIES:   Allergies as of 04/28/2023    (No Known Allergies)      OBJECTIVE:   weight is 236 lb 3.2 oz (107.1 kg). Her blood pressure is 115/70 and her pulse is 103 (abnormal). Her oxygen saturation is 99%. PHYSICAL EXAM:    CONSTITUTIONAL: She is a 37y.o.-year-old who appears her stated age. She is alert and oriented x 3 and in no acute distress. HEENT: PERRL No scleral icterus. No thrush, atraumatic, normocephalic. NECK: Supple, without cervical or supraclavicular lymphadenopathy:  CARDIOVASCULAR: S1 S2 RRR. Without murmer  RESPIRATORY & CHEST: Lungs are clear to auscultation and percussion. No wheezing, no crackles. Good air movement  GASTROINTESTINAL & ABDOMEN: Soft, nontender, positive bowel sounds in all quadrants, non-distended, without hepatosplenomegaly. GENITOURINARY: Deferred. MUSCULOSKELETAL: No tenderness to palpation of the axial skeleton. There is no clubbing. No cyanosis. No edema of the lower extremities.    SKIN OF

## 2023-05-02 NOTE — TELEPHONE ENCOUNTER
Spoke with patient, notified her that PA is still pending. And when a decision is received our office will reach out to her.

## 2023-05-04 DIAGNOSIS — E11.9 TYPE 2 DIABETES MELLITUS WITHOUT COMPLICATION, WITHOUT LONG-TERM CURRENT USE OF INSULIN (HCC): ICD-10-CM

## 2023-05-04 DIAGNOSIS — R53.83 FATIGUE, UNSPECIFIED TYPE: ICD-10-CM

## 2023-05-04 DIAGNOSIS — M79.10 MYALGIA: ICD-10-CM

## 2023-05-04 LAB
ALBUMIN SERPL-MCNC: 4.2 G/DL (ref 3.4–5)
ALBUMIN/GLOB SERPL: 1.6 {RATIO} (ref 1.1–2.2)
ALP SERPL-CCNC: 72 U/L (ref 40–129)
ALT SERPL-CCNC: 10 U/L (ref 10–40)
ANION GAP SERPL CALCULATED.3IONS-SCNC: 10 MMOL/L (ref 3–16)
AST SERPL-CCNC: 11 U/L (ref 15–37)
BASOPHILS # BLD: 0 K/UL (ref 0–0.2)
BASOPHILS NFR BLD: 0.4 %
BILIRUB SERPL-MCNC: 0.5 MG/DL (ref 0–1)
BUN SERPL-MCNC: 12 MG/DL (ref 7–20)
CALCIUM SERPL-MCNC: 9 MG/DL (ref 8.3–10.6)
CHLORIDE SERPL-SCNC: 101 MMOL/L (ref 99–110)
CHOLEST SERPL-MCNC: 210 MG/DL (ref 0–199)
CK SERPL-CCNC: 43 U/L (ref 26–192)
CO2 SERPL-SCNC: 26 MMOL/L (ref 21–32)
CREAT SERPL-MCNC: 0.8 MG/DL (ref 0.6–1.1)
DEPRECATED RDW RBC AUTO: 13.9 % (ref 12.4–15.4)
EOSINOPHIL # BLD: 0 K/UL (ref 0–0.6)
EOSINOPHIL NFR BLD: 0.3 %
ERYTHROCYTE [SEDIMENTATION RATE] IN BLOOD BY WESTERGREN METHOD: 39 MM/HR (ref 0–20)
GFR SERPLBLD CREATININE-BSD FMLA CKD-EPI: >60 ML/MIN/{1.73_M2}
GLUCOSE SERPL-MCNC: 147 MG/DL (ref 70–99)
HCT VFR BLD AUTO: 39.9 % (ref 36–48)
HDLC SERPL-MCNC: 54 MG/DL (ref 40–60)
HGB BLD-MCNC: 13.3 G/DL (ref 12–16)
LDLC SERPL CALC-MCNC: 140 MG/DL
LYMPHOCYTES # BLD: 2.6 K/UL (ref 1–5.1)
LYMPHOCYTES NFR BLD: 32.2 %
MCH RBC QN AUTO: 30.5 PG (ref 26–34)
MCHC RBC AUTO-ENTMCNC: 33.4 G/DL (ref 31–36)
MCV RBC AUTO: 91.4 FL (ref 80–100)
MONOCYTES # BLD: 0.4 K/UL (ref 0–1.3)
MONOCYTES NFR BLD: 5.3 %
NEUTROPHILS # BLD: 4.9 K/UL (ref 1.7–7.7)
NEUTROPHILS NFR BLD: 61.8 %
PLATELET # BLD AUTO: 194 K/UL (ref 135–450)
PMV BLD AUTO: 9.1 FL (ref 5–10.5)
POTASSIUM SERPL-SCNC: 4.5 MMOL/L (ref 3.5–5.1)
PROT SERPL-MCNC: 6.9 G/DL (ref 6.4–8.2)
RBC # BLD AUTO: 4.36 M/UL (ref 4–5.2)
SODIUM SERPL-SCNC: 137 MMOL/L (ref 136–145)
TRIGL SERPL-MCNC: 78 MG/DL (ref 0–150)
TSH SERPL DL<=0.005 MIU/L-ACNC: 1.63 UIU/ML (ref 0.27–4.2)
VLDLC SERPL CALC-MCNC: 16 MG/DL
WBC # BLD AUTO: 8 K/UL (ref 4–11)

## 2023-05-05 PROBLEM — F51.01 PRIMARY INSOMNIA: Status: ACTIVE | Noted: 2023-05-05

## 2023-05-05 PROBLEM — K21.9 GASTROESOPHAGEAL REFLUX DISEASE: Status: ACTIVE | Noted: 2023-05-05

## 2023-05-05 PROBLEM — L84 CALLUS OF FOOT: Status: ACTIVE | Noted: 2023-05-05

## 2023-05-05 PROBLEM — M79.10 MYALGIA: Status: ACTIVE | Noted: 2023-05-05

## 2023-05-05 PROBLEM — R53.83 FATIGUE: Status: ACTIVE | Noted: 2023-05-05

## 2023-05-05 PROBLEM — R20.0 NUMBNESS OF RIGHT FOOT: Status: ACTIVE | Noted: 2023-05-05

## 2023-05-05 PROBLEM — F17.200 CURRENT SMOKER: Status: ACTIVE | Noted: 2023-05-05

## 2023-05-05 LAB
EST. AVERAGE GLUCOSE BLD GHB EST-MCNC: 197.3 MG/DL
HBA1C MFR BLD: 8.5 %

## 2023-05-05 ASSESSMENT — ENCOUNTER SYMPTOMS
WHEEZING: 0
SINUS PAIN: 0
BLOOD IN STOOL: 0
CHEST TIGHTNESS: 0
COUGH: 0
RHINORRHEA: 0
DIARRHEA: 0
SINUS PRESSURE: 0
CONSTIPATION: 0
ABDOMINAL PAIN: 0
NAUSEA: 0
SORE THROAT: 0
VOMITING: 0
SHORTNESS OF BREATH: 0

## 2023-05-05 NOTE — TELEPHONE ENCOUNTER
Patient notified, she asked if could be resubmitted. I notified her that we will send to the PA team to be submitted again with emphasis on that she has tried metformin with inadequate response see Dr. Jagruti Velez last visit note.

## 2023-05-05 NOTE — TELEPHONE ENCOUNTER
Call patient. Her insurance denied Ozempic because they did not have record of patient trying and having and inadequate response to Metformin or Insulin. Inform patient that we will try to resubmit the prior authorization with the information in my visit note that she has tried Metformin in the past and had side effect of low sugar. Contact Prior Authorization to resubmit with information regarding Metformin.

## 2023-05-08 ENCOUNTER — TELEPHONE (OUTPATIENT)
Dept: ADMINISTRATIVE | Age: 43
End: 2023-05-08

## 2023-05-11 ENCOUNTER — PROCEDURE VISIT (OUTPATIENT)
Dept: NEUROLOGY | Age: 43
End: 2023-05-11
Payer: COMMERCIAL

## 2023-05-11 DIAGNOSIS — R20.0 RIGHT LEG NUMBNESS: ICD-10-CM

## 2023-05-11 DIAGNOSIS — G56.03 BILATERAL CARPAL TUNNEL SYNDROME: Primary | ICD-10-CM

## 2023-05-11 PROCEDURE — 95912 NRV CNDJ TEST 11-12 STUDIES: CPT | Performed by: PSYCHIATRY & NEUROLOGY

## 2023-05-11 PROCEDURE — 95886 MUSC TEST DONE W/N TEST COMP: CPT | Performed by: PSYCHIATRY & NEUROLOGY

## 2023-05-11 NOTE — PROGRESS NOTES
Iliana Dobbins M.D. [facility]) [facility]  Board Certified in [address], [address] [place] [address]    EMG / NERVE CONDUCTION STUDY      PATIENT:  Octavio Banks       DATE OF EM23     YOB: 1980       REASON FOR EMG:   Bilateral arm pain and numbness, patient also has right leg numbness, please do EMG nerve conduction studies of both upper extremities and the right lower extremity      REFERRING PHYSICIAN:  Alec Harrison MD  [address]     SUMMARY:   The right median motor and sensory nerve studies are prolonged distal latencies  The left median motor nerve study was normal.  The left median sensory nerve study had a prolonged distal latency. The right ulnar motor nerve study had a slowing of conduction velocity across the elbow. The left ulnar motor nerve study was normal.  Bilateral ulnar sensory nerve studies were normal.  The right peroneal and posterior tibial motor nerve studies were normal.  The right sural and superficial peroneal sensory nerve studies were not recordable. Needle EMG of several muscles of both upper extremities and the right lower extremity was normal.      CLINICAL DIAGNOSIS:  Neuropathy        EMG RESULTS:     1. This patient has bilateral median nerve lesions at the wrist.  (Carpal tunnel syndrome). The right side is moderately severe whereas the left side is mild. 2.  There is also a moderately severe right ulnar nerve lesion at the elbow. The left side is within normal limits. 3.  There is evidence of a mild sensory polyneuropathy in the right lower extremity.        ---------------------------------------------  Iliana Dobbins M.D.   Electromyographer / Neurologist

## 2023-05-26 ENCOUNTER — TELEPHONE (OUTPATIENT)
Dept: PRIMARY CARE CLINIC | Age: 43
End: 2023-05-26

## 2023-06-19 PROBLEM — G47.33 OSA (OBSTRUCTIVE SLEEP APNEA): Status: ACTIVE | Noted: 2023-06-19

## 2023-06-20 ENCOUNTER — TELEPHONE (OUTPATIENT)
Dept: PULMONOLOGY | Age: 43
End: 2023-06-20